# Patient Record
Sex: FEMALE | Race: WHITE | URBAN - NONMETROPOLITAN AREA
[De-identification: names, ages, dates, MRNs, and addresses within clinical notes are randomized per-mention and may not be internally consistent; named-entity substitution may affect disease eponyms.]

---
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
## 2025-01-20 ENCOUNTER — OFFICE VISIT (OUTPATIENT)
Dept: ORTHOPEDIC SURGERY | Facility: CLINIC | Age: 20
End: 2025-01-20
Payer: COMMERCIAL

## 2025-01-20 ENCOUNTER — HOSPITAL ENCOUNTER (OUTPATIENT)
Dept: RADIOLOGY | Facility: HOSPITAL | Age: 20
Discharge: HOME | End: 2025-01-20
Payer: COMMERCIAL

## 2025-01-20 DIAGNOSIS — M25.561 RIGHT KNEE PAIN, UNSPECIFIED CHRONICITY: ICD-10-CM

## 2025-01-20 DIAGNOSIS — S82.131A RIGHT MEDIAL TIBIAL PLATEAU FRACTURE, CLOSED, INITIAL ENCOUNTER: Primary | ICD-10-CM

## 2025-01-20 DIAGNOSIS — M23.91 INTERNAL DERANGEMENT OF RIGHT KNEE: ICD-10-CM

## 2025-01-20 PROCEDURE — 73564 X-RAY EXAM KNEE 4 OR MORE: CPT | Mod: RT

## 2025-01-20 PROCEDURE — 99203 OFFICE O/P NEW LOW 30 MIN: CPT | Performed by: STUDENT IN AN ORGANIZED HEALTH CARE EDUCATION/TRAINING PROGRAM

## 2025-01-20 PROCEDURE — L1812 KO ELASTIC W/JOINTS PRE OTS: HCPCS | Performed by: STUDENT IN AN ORGANIZED HEALTH CARE EDUCATION/TRAINING PROGRAM

## 2025-01-20 PROCEDURE — 73564 X-RAY EXAM KNEE 4 OR MORE: CPT | Mod: RIGHT SIDE | Performed by: STUDENT IN AN ORGANIZED HEALTH CARE EDUCATION/TRAINING PROGRAM

## 2025-01-21 ENCOUNTER — HOSPITAL ENCOUNTER (OUTPATIENT)
Dept: RADIOLOGY | Facility: HOSPITAL | Age: 20
Discharge: HOME | End: 2025-01-21
Payer: COMMERCIAL

## 2025-01-21 DIAGNOSIS — M25.561 RIGHT KNEE PAIN, UNSPECIFIED CHRONICITY: ICD-10-CM

## 2025-01-21 DIAGNOSIS — M23.91 INTERNAL DERANGEMENT OF RIGHT KNEE: ICD-10-CM

## 2025-01-21 DIAGNOSIS — S82.131A RIGHT MEDIAL TIBIAL PLATEAU FRACTURE, CLOSED, INITIAL ENCOUNTER: ICD-10-CM

## 2025-01-21 PROCEDURE — 73721 MRI JNT OF LWR EXTRE W/O DYE: CPT | Mod: RT

## 2025-01-21 PROCEDURE — 73721 MRI JNT OF LWR EXTRE W/O DYE: CPT | Mod: RIGHT SIDE | Performed by: RADIOLOGY

## 2025-01-22 ENCOUNTER — OFFICE VISIT (OUTPATIENT)
Dept: ORTHOPEDIC SURGERY | Facility: CLINIC | Age: 20
End: 2025-01-22
Payer: COMMERCIAL

## 2025-01-22 DIAGNOSIS — S83.411A SPRAIN OF MEDIAL COLLATERAL LIGAMENT OF RIGHT KNEE, INITIAL ENCOUNTER: Primary | ICD-10-CM

## 2025-01-22 PROCEDURE — 1036F TOBACCO NON-USER: CPT | Performed by: STUDENT IN AN ORGANIZED HEALTH CARE EDUCATION/TRAINING PROGRAM

## 2025-01-22 PROCEDURE — 99213 OFFICE O/P EST LOW 20 MIN: CPT | Performed by: STUDENT IN AN ORGANIZED HEALTH CARE EDUCATION/TRAINING PROGRAM

## 2025-01-22 NOTE — PROGRESS NOTES
Acute Injury New Patient Visit    HPI: Breanne is a 19 y.o.female who presents today for follow-up of her right knee injury and MRI results review.  She has a grade 1-2 MCL sprain with a possible distal sprain of the MPFL, with no meniscus injury or osseous injury.  She states that the brace gives her some good support.  She was uncomfortable initially with sleep, but was able to position her knee where she was able to be more comfortable.  She is headed back to school this weekend, but is going to be able to set up some physical therapy with her uncle/down at school.    On 1/20/2025, she presented with new complaints of right knee injury.  Yesterday, she was training a horse, and fell off of the horse directly onto the right knee, landing more onto the superior medial aspect.  She has some swelling and bruising over the superior medial knee.  She is able to walk, but feels a shooting pain medially.  She states it feels tight.  She is unable to flex fully.  She has been trying ice, ibuprofen, and elevation.  She is here with mom.  She denies any numbness and tingling.  She denies any radiation or hip pain.    Plan: Today, on 1/22/2025, for this grade 2 MCL sprain and mild MPFL sprain, we will continue her in her laterally stabilizing knee brace, start physical therapy, continue with conservative treat measures including rest, ice, elevation, and over-the-counter ibuprofen.  Will follow-up in approximately 4 weeks.    On 1/20/2025, for this right knee internal derangement, possible medial meniscus tear, MCL sprain, and possible occult fracture given her exquisite tenderness on palpation over the medial joint line, we will obtain a stat MRI, fit her in a free sport knee brace, use crutches as needed for weightbearing as tolerated, continue with rest, ice, elevation, and ibuprofen, and follow-up with results.  Mom understands and agrees with plan.    Assessment:   Problem List Items Addressed This Visit    None  Visit  Diagnoses       Sprain of medial collateral ligament of right knee, initial encounter    -  Primary    Relevant Orders    Referral to Physical Therapy              Diagnostics: Reviewed all relevant imaging including x-ray, MRI, CT, and US.      Procedure:  Procedures    Physical Exam:  GENERAL:  No obvious acute distress.  NEURO:  Distally neurovascularly intact.  Sensation intact to light touch.  Extremity: Right knee examination shows:  Skin is intact;  No erythema or warmth;  Mild edema over the soft tissue of the medial aspect of the knee and just superior to the medial aspect of the patella with minimal ecchymosis;  Trace joint effusion;  Can flex the left knee to 130 degrees;  Full extension at 0 degrees;  TENDER over the patella especially over the superior medial aspect of the patella;  POSITIVE patellar grind test;  Exquisitely TENDER over the medial joint line;  No pain over the lateral joint line;  TENDER over the patellar and quadricep tendon, especially over the vastus medialis distally;  TENDER over the proximal tibia medially;  No pain over the popliteal fossa;  Weakly POSITIVE valgus stress test;  Negative varus stress test;  POSITIVE Aki's test medially with no instability pain;  Negative Aki's test laterally with no instability;  Negative Lachman's test;  Patellar and quadricep mechanism intact;  Negative anterior and posterior drawer test;  Negative patellar apprehension test;  Distal pulses are palpable;  Neurovascularly intact; and  Walking with no significant antalgic gait.    Orders Placed This Encounter    Referral to Physical Therapy      At the conclusion of the visit there were no further questions by the patient/family regarding their plan of care.  Patient was instructed to call or return with any issues, questions, or concerns regarding their injury and/or treatment plan described above.     01/22/25 at 10:52 AM - Rio Timmons DO    Office: [phone]    This note  was prepared using voice recognition software.  The details of this note are correct and have been reviewed, and corrected to the best of my ability.  Some grammatical errors may persist related to the Dragon software.

## 2025-02-21 ENCOUNTER — APPOINTMENT (OUTPATIENT)
Dept: ORTHOPEDIC SURGERY | Facility: CLINIC | Age: 20
End: 2025-02-21
Payer: COMMERCIAL

## 2025-02-21 DIAGNOSIS — S83.411A SPRAIN OF MEDIAL COLLATERAL LIGAMENT OF RIGHT KNEE, INITIAL ENCOUNTER: Primary | ICD-10-CM

## 2025-02-21 NOTE — PROGRESS NOTES
Acute Injury New Patient Visit    HPI: Breanne is a 19 y.o.female who presents today for follow-up of her grade 1-2 MCL sprain and MPFL sprain of the right.  She states that overall she is significantly better, but still has some numbness over the anterior knee and occasionally certain movements will make it worse, including squatting all the way down.  She has been doing physical therapy down at school.  She has been doing her home exercises daily.  She has been out of her brace for 1 to 2 weeks.  Denies any interval falls or injuries.    On 1/22/2025, she presented for follow-up of her right knee injury and MRI results review.  She has a grade 1-2 MCL sprain with a possible distal sprain of the MPFL, with no meniscus injury or osseous injury.  She states that the brace gives her some good support.  She was uncomfortable initially with sleep, but was able to position her knee where she was able to be more comfortable.  She is headed back to school this weekend, but is going to be able to set up some physical therapy with her uncle/down at school.    On 1/20/2025, she presented with new complaints of right knee injury.  Yesterday, she was training a horse, and fell off of the horse directly onto the right knee, landing more onto the superior medial aspect.  She has some swelling and bruising over the superior medial knee.  She is able to walk, but feels a shooting pain medially.  She states it feels tight.  She is unable to flex fully.  She has been trying ice, ibuprofen, and elevation.  She is here with mom.  She denies any numbness and tingling.  She denies any radiation or hip pain.    Plan: Today, on 2/21/2025, we will have her return to her brace for any activities where she is on her leg or walking far distances or riding her horse, continue with her physical therapy and home exercises, and follow-up in 3 to 4 weeks.  I suggest that she continue some icing as well.    On 1/22/2025, for this grade 2 MCL sprain  and mild MPFL sprain, we will continue her in her laterally stabilizing knee brace, start physical therapy, continue with conservative treat measures including rest, ice, elevation, and over-the-counter ibuprofen.  Will follow-up in approximately 4 weeks.    On 1/20/2025, for this right knee internal derangement, possible medial meniscus tear, MCL sprain, and possible occult fracture given her exquisite tenderness on palpation over the medial joint line, we will obtain a stat MRI, fit her in a free sport knee brace, use crutches as needed for weightbearing as tolerated, continue with rest, ice, elevation, and ibuprofen, and follow-up with results.  Mom understands and agrees with plan.    Assessment:   Problem List Items Addressed This Visit    None  Visit Diagnoses       Sprain of medial collateral ligament of right knee, initial encounter    -  Primary                Diagnostics: Reviewed all relevant imaging including x-ray, MRI, CT, and US.      Procedure:  Procedures    Physical Exam:  GENERAL:  No obvious acute distress.  NEURO:  Distally neurovascularly intact.  Sensation intact to light touch.  Extremity: Right knee examination shows:  Skin is intact;  No erythema or warmth;  Minimal edema medially with no ecchymosis;  No effusion;  Can flex the left knee to 130 degrees;  Full extension at 0 degrees;  No pain over the patella;  Negative patellar grind test;  Still mildly TENDER over the medial joint line;  No pain over the lateral joint line;  No pain over the patellar or quadricep tendon;  No pain over the proximal tibia;  No pain over the popliteal fossa;  Weakly POSITIVE valgus stress test;  Negative varus stress test;  Negative Aki's test medially with no instability;  Negative Aki's test laterally with no instability;  Negative Lachman's test;  Patellar and quadricep mechanism intact;  Negative anterior and posterior drawer test;  Negative patellar apprehension test;  Distal pulses are  palpable;  Neurovascularly intact; and  Walking with no significant antalgic gait.    No orders of the defined types were placed in this encounter.     At the conclusion of the visit there were no further questions by the patient/family regarding their plan of care.  Patient was instructed to call or return with any issues, questions, or concerns regarding their injury and/or treatment plan described above.     02/21/25 at 4:22 PM - Rio Timmons, DO    Office: [phone]    This note was prepared using voice recognition software.  The details of this note are correct and have been reviewed, and corrected to the best of my ability.  Some grammatical errors may persist related to the Dragon software.

## 2025-03-25 ENCOUNTER — APPOINTMENT (OUTPATIENT)
Dept: ORTHOPEDIC SURGERY | Facility: CLINIC | Age: 20
End: 2025-03-25
Payer: COMMERCIAL